# Patient Record
(demographics unavailable — no encounter records)

---

## 2025-01-29 NOTE — DATA REVIEWED
[FreeTextEntry1] : On review of growth charts, noted to have been <5% for height until around 5 years of age when he increased briefly to the 6%. Now for the last 4 years has been back to <3% for length.

## 2025-01-29 NOTE — CONSULT LETTER
[Dear  ___] : Dear  [unfilled], [Consult Letter:] : I had the pleasure of evaluating your patient, [unfilled]. [Please see my note below.] : Please see my note below. [Consult Closing:] : Thank you very much for allowing me to participate in the care of this patient.  If you have any questions, please do not hesitate to contact me. [Sincerely,] : Sincerely, [FreeTextEntry3] : Bria Perales MD Pediatric Endocrinology [facility] [phone]

## 2025-01-29 NOTE — ASSESSMENT
[FreeTextEntry1] : Leonel is a 9y7mM who is presenting for initial evaluation for short stature.    Plan:  - Labs to be done ASAP.  - Once done, mother to call office and I will call her once I review.  - Bone Age done in November 2024, per mother at [place] ordered by PCP. I will review images and establish if appropriate for MPTH. Will discuss with mother once I call her to discuss the lab results.   - Follow up Fall 2025- sooner if indicated by labs or BAP.   Bria Perales MD Pediatric Endocrinology [facility] [phone]

## 2025-01-29 NOTE — HISTORY OF PRESENT ILLNESS
[FreeTextEntry2] : Leonel is a 9y7mM who is presenting for initial evaluation for short stature.   Birth History:  Term: Full term  Weight: 7lb8oz Length: 32.5cm Issues/complications with pregnancy/delivery: Denies  Maternal GDM: Denies   Sees GI for encopresis starting around 4 years old.  Dr. Vasquez- Started ex lax chocolates and doing very well. Daily BM now- no issues.   Presenting for short stature.  On review of growth charts, noted to have been <5% for height until around 5 years of age when he increased briefly to the 6%. Now for the last 4 years has been back to <3% for length.   Weight noted to be variable- between 5-34%.   Family Height:  Mother: 62 inches  Father: 67 inches  MPTH: 67 inches  Siblings: One older brother - mother reports normal growth Males less than 65 inches: Denies  Females less than 60 inches: Denies  Family members who have ever required medication for growth: Denies  Symptoms: Denies headaches, blurry vision, nausea, vomiting, polyuria, polydipsia.   Development: He is in the 4th grade. Normal development.   Per mother, Bone Age done in November 2024- ordered by PCP. I have not been provided these records. Will obtain.

## 2025-01-29 NOTE — DISCUSSION/SUMMARY
[FreeTextEntry1] : Leonel is a 9y7mM who is presenting for initial evaluation for short stature.   This patient's presentation could be compatible with one of several scenarios: 1. Familial short stature. 2. Constitutional delay of puberty and growth, with or without #1. 3. Growth hormone deficiency, either in isolation or in combination with other pituitary hormone deficiencies. These may be of a congenital (genetic) or acquired nature. 4. Thyroid hormone deficiency, usually acquired in older children. 5. Systemic illnesses predisposing to poor growth, such as malabsorptive processes, inflammatory diseases, diseases associated with tissue hypoxia or acidosis. 6. Psycho-social disturbances associated with poor growth.  These problems will be differentiated in this particular patient based on the above family & personal medical history, physical examination, and laboratory tests once these become available.

## 2025-01-29 NOTE — PHYSICAL EXAM
[Healthy Appearing] : healthy appearing [Well Nourished] : well nourished [Interactive] : interactive [Looks Younger than Stated Years] : looks younger than stated years [Normal Appearance] : normal appearance [Well formed] : well formed [Normally Set] : normally set [WNL for age] : within normal limits of age [Normal S1 and S2] : normal S1 and S2 [Clear to Ausculation Bilaterally] : clear to auscultation bilaterally [Abdomen Soft] : soft [Abdomen Tenderness] : non-tender [] : no hepatosplenomegaly [1] : was Bear stage 1 [Scant] : scant [___] : [unfilled] [Normal] : normal  [Dysmorphic] : non-dysmorphic [Microcephaly] : no microcephaly [Goiter] : no goiter [Enlarged Diffusely] : was not enlarged [Murmur] : no murmurs